# Patient Record
Sex: FEMALE | Race: WHITE | Employment: UNEMPLOYED | URBAN - METROPOLITAN AREA
[De-identification: names, ages, dates, MRNs, and addresses within clinical notes are randomized per-mention and may not be internally consistent; named-entity substitution may affect disease eponyms.]

---

## 2018-02-17 ENCOUNTER — HOSPITAL ENCOUNTER (EMERGENCY)
Age: 23
Discharge: HOME | End: 2018-02-17
Attending: EMERGENCY MEDICINE
Payer: COMMERCIAL

## 2018-02-17 VITALS
SYSTOLIC BLOOD PRESSURE: 152 MMHG | HEART RATE: 109 BPM | OXYGEN SATURATION: 99 % | WEIGHT: 226.06 LBS | BODY MASS INDEX: 36.33 KG/M2 | TEMPERATURE: 98.2 F | RESPIRATION RATE: 18 BRPM | HEIGHT: 66 IN | DIASTOLIC BLOOD PRESSURE: 89 MMHG
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
## 2018-02-17 PROCEDURE — 99282 EMERGENCY DEPT VISIT SF MDM: CPT

## 2018-02-17 RX ORDER — BUPROPION HYDROCHLORIDE 300 MG/1
300 TABLET ORAL EVERY MORNING
COMMUNITY

## 2018-02-17 RX ORDER — PREDNISONE 20 MG/1
40 TABLET ORAL DAILY
Qty: 10 TABLET | Refills: 0 | Status: SHIPPED | OUTPATIENT
Start: 2018-02-17 | End: 2018-02-22

## 2018-02-17 RX ORDER — DIAPER,BRIEF,INFANT-TODD,DISP
EACH MISCELLANEOUS
Qty: 1 TUBE | Refills: 0 | Status: SHIPPED | OUTPATIENT
Start: 2018-02-17 | End: 2018-02-24

## 2018-02-17 ASSESSMENT — ENCOUNTER SYMPTOMS
COLOR CHANGE: 1
TROUBLE SWALLOWING: 0
VOMITING: 0
SHORTNESS OF BREATH: 0
COUGH: 0
BACK PAIN: 0
NAUSEA: 0
FACIAL SWELLING: 0

## 2018-02-17 ASSESSMENT — PAIN DESCRIPTION - LOCATION: LOCATION: HAND;LEG

## 2018-02-17 ASSESSMENT — PAIN DESCRIPTION - PAIN TYPE: TYPE: ACUTE PAIN

## 2018-02-17 ASSESSMENT — PAIN SCALES - GENERAL: PAINLEVEL_OUTOF10: 5

## 2018-02-17 ASSESSMENT — PAIN DESCRIPTION - ORIENTATION: ORIENTATION: RIGHT;LEFT

## 2018-02-17 ASSESSMENT — PAIN DESCRIPTION - DESCRIPTORS: DESCRIPTORS: TENDER

## 2018-02-17 NOTE — ED PROVIDER NOTES
[address] [place] [address] ED  eMERGENCY dEPARTMENT eNCOUnter      Pt Name: Ivet Osborn  MRN: [id number]  [place] 1995  Date of evaluation: 2/17/2018  Provider: Margot Benton NP    CHIEF COMPLAINT       Chief Complaint   Patient presents with    Urticaria     with redness/swelling to hands and rt leg past week(tx at  at onset)         HISTORY OF PRESENT ILLNESS  (Location/Symptom, Timing/Onset, Context/Setting, Quality, Duration, Modifying Factors, Severity.)   Ivet Osborn is a 20 y.o. female who presents to the emergency department Private auto with redness to the hand and leg. She noticed red areas to both hands and behind the right leg 2 days ago. She had similar areas on her arms and hands one week ago that improved with Zyrtec and hydrocortisone cream.  No injuries to these areas. No drainage. She denies fevers. She does complain of irritation and itching that she rates at a 5. No throat tightness or difficulty swallowing. No changes to soaps, lotions or detergents. No new medications. Nursing Notes were reviewed. ALLERGIES     Review of patient's allergies indicates no known allergies. CURRENT MEDICATIONS       Discharge Medication List as of 2/17/2018 10:35 AM      CONTINUE these medications which have NOT CHANGED    Details   Cetirizine HCl (ZYRTEC ALLERGY) 10 MG CAPS Take by mouth dailyHistorical Med      buPROPion (WELLBUTRIN XL) 300 MG extended release tablet Take 300 mg by mouth every morningHistorical Med             PAST MEDICAL HISTORY         Diagnosis Date    Depression        SURGICAL HISTORY     History reviewed. No pertinent surgical history. FAMILY HISTORY     History reviewed. No pertinent family history. No family status information on file. SOCIAL HISTORY      reports that she has been smoking Cigarettes. She does not have any smokeless tobacco history on file. She reports that she uses drugs, including Marijuana.  She reports that she does not drink alcohol. REVIEW OF SYSTEMS    (2-9 systems for level 4, 10 or more for level 5)     Review of Systems   Constitutional: Negative for activity change and fever. HENT: Negative for facial swelling and trouble swallowing. Respiratory: Negative for cough and shortness of breath. Cardiovascular: Negative for chest pain. Gastrointestinal: Negative for nausea and vomiting. Musculoskeletal: Negative for back pain and myalgias. Skin: Positive for color change. Negative for wound. Except as noted above the remainder of the review of systems was reviewed and negative. PHYSICAL EXAM    (up to 7 for level 4, 8 or more for level 5)     ED Triage Vitals [02/17/18 0957]   BP Temp Temp Source Pulse Resp SpO2 Height Weight   (!) 152/89 98.2 °F (36.8 °C) Oral 109 18 99 % 5' 6\" (1.676 m) 226 lb 1 oz (102.5 kg)       Physical Exam   Constitutional: She appears well-developed and well-nourished. She does not appear ill. No distress. HENT:   Head: Normocephalic and atraumatic. Eyes: Conjunctivae are normal.   Cardiovascular: Normal rate and regular rhythm. Pulmonary/Chest: Breath sounds normal. No respiratory distress. Musculoskeletal: Normal range of motion. She exhibits no edema or deformity. Legs:  Full range of motion to the fingers and wrists bilaterally. Slightly raised firm area measuring approximately 3 mm the dorsum of the right hand over the second MCP joint with approximately 2 and half centimeters of surrounding erythema. Similar raised area to the dorsum of the left hand over the third MCP joint again with 2 half centimeters of surrounding erythema. Skin: Skin is warm and dry.          DIAGNOSTIC RESULTS     EKG: All EKG's are interpreted by the Emergency Department Physician who either signs or Co-signs this chart in the absence of a cardiologist.    RADIOLOGY:   Non-plain film images such as CT, Ultrasound and MRI are read by the radiologist. Plain radiographic images are visualized and preliminarily interpreted by the emergency physician with the below findings:    Interpretation per the Radiologist below, if available at the time of this note:    No orders to display         LABS:  Labs Reviewed - No data to display    All other labs were within normal range or not returned as of this dictation. EMERGENCY DEPARTMENT COURSE and DIFFERENTIAL DIAGNOSIS/MDM:   Vitals:    Vitals:    18 0957   BP: (!) 152/89   Pulse: 109   Resp: 18   Temp: 98.2 °F (36.8 °C)   TempSrc: Oral   SpO2: 99%   Weight: 226 lb 1 oz (102.5 kg)   Height: 5' 6\" (1.676 m)       Medical Decision Makin-year-old female with what appears to be bug bites to the hands and leg. She is afebrile and does not appear ill. She has not distress. She will be treated with prednisone and hydrocortisone cream.      FINAL IMPRESSION      1. Insect bite, initial encounter          DISPOSITION/PLAN   DISPOSITION Decision To Discharge 2018 10:34:03 AM      PATIENT REFERRED TO:   Follow up with a Primary care provider  [id number]          DISCHARGE MEDICATIONS:     Discharge Medication List as of 2018 10:35 AM      START taking these medications    Details   predniSONE (DELTASONE) 20 MG tablet Take 2 tablets by mouth daily for 5 days, Disp-10 tablet, R-0Print      hydrocortisone 1 % cream Apply topically 2 -3 times daily for 5 - 7 days. , Disp-1 Tube, R-0, Print             (Please note that portions of this note were completed with a voice recognition program.  Efforts were made to edit the dictations but occasionally words are mis-transcribed. )    SHANE SUAREZ NP  18 [id number]